# Patient Record
Sex: FEMALE | Race: BLACK OR AFRICAN AMERICAN | NOT HISPANIC OR LATINO | Employment: FULL TIME | URBAN - NONMETROPOLITAN AREA
[De-identification: names, ages, dates, MRNs, and addresses within clinical notes are randomized per-mention and may not be internally consistent; named-entity substitution may affect disease eponyms.]

---

## 2019-08-20 ENCOUNTER — TRANSCRIBE ORDERS (OUTPATIENT)
Dept: PHYSICAL THERAPY | Facility: HOSPITAL | Age: 44
End: 2019-08-20

## 2019-08-20 ENCOUNTER — HOSPITAL ENCOUNTER (OUTPATIENT)
Dept: PHYSICAL THERAPY | Facility: HOSPITAL | Age: 44
Setting detail: THERAPIES SERIES
Discharge: HOME | End: 2019-08-20

## 2019-08-20 DIAGNOSIS — M51.16 INTERVERTEBRAL DISC DISORDER WITH RADICULOPATHY OF LUMBAR REGION: Primary | ICD-10-CM

## 2019-08-20 DIAGNOSIS — M51.16 INTERVERTEBRAL DISC DISORDERS WITH RADICULOPATHY, LUMBAR REGION: Primary | ICD-10-CM

## 2019-08-20 PROCEDURE — 97110 THERAPEUTIC EXERCISES: CPT | Performed by: PHYSICAL THERAPIST

## 2019-08-20 PROCEDURE — 97162 PT EVAL MOD COMPLEX 30 MIN: CPT | Performed by: PHYSICAL THERAPIST

## 2019-08-20 NOTE — THERAPY EVALUATION
Outpatient Physical Therapy Ortho Initial Evaluation  [facility]  Megha Selby, PT, DPT, CSCS       Patient Name: Elle Aguilera  : 1975  MRN: [id number]  Today's Date: 2019      Visit Date: 2019    Pt reports 6/10 pain pre treatment, 2/10 pain post treatment  Reports N/A% of improvement.  Attended  visits.  Insurance available: medical necessity  Next MD appt: 2019.  Recertification: 09/10/2019.     Past Medical History:   Diagnosis Date   • Hypertension         Past Surgical History:   Procedure Laterality Date   • BACK SURGERY  2015    Spinal Fusion- L4/L5   • HYSTERECTOMY     • ULNAR NERVE TRANSPOSITION Right        Visit Dx:     ICD-10-CM ICD-9-CM   1. Intervertebral disc disorder with radiculopathy of lumbar region M51.16 724.4     Number of days off work: None    Patient is .    Patient has grown children.    Allergies       Bactrim [Sulfamethoxazole-trimethoprim]Mental Status Change   Reglan [Metoclopramide]Other (See Comments), GI Intolerance     Medications:Lasix, Amlopine, Khlor-con, Lisinopril      Patient History     Row Name 19 1500             History    Chief Complaint  Pain  -AJ      Type of Pain  Back pain  -AJ      Date Current Problem(s) Began  -- Chronic  -AJ      Brief Description of Current Complaint  patient reprots she had a spinal fusion almost 4 years ago and was doing well. She rprots then 2019 they came up on a montrell who was unrespnsive so her and 3 of her coworkers decided to lift him and he was almost 300#. She reports the next morning when she got out of bed she felt ronny nils her back. She reports pain down the entire L leg and in her LB again.  -AJ      Previous treatment for THIS PROBLEM  Medication;Surgery;Rehabilitation  -AJ      Surgery Date:  -- 2015  -AJ      Patient/Caregiver Goals  Relieve pain;Return to prior level of function  -AJ      Current Tobacco Use  ~1/2ppd  -AJ      Smoking  Status  daily smoker  -AJ      Occupation/sports/leisure activities  Occupation: ; Hobbies: anything outdoor, camping, fishing  -AJ      Patient seeing anyone else for problem(s)?  Yes, neurosurgeon  -AJ      What clinical tests have you had for this problem?  -- None, MRI was too expensive  -AJ      History of Previous Related Injuries  None prior to this.  -AJ      Are you or can you be pregnant  No  -AJ         Pain     Pain Location  Back  -AJ      Pain at Present  6  -AJ      Pain at Best  1  -AJ      Pain at Worst  10  -AJ      Pain Frequency  Constant/continuous  -AJ      Pain Description  Pressure;Pins and needles  -AJ      What Performance Factors Make the Current Problem(s) WORSE?  sitting, walking longer distances  -AJ      What Performance Factors Make the Current Problem(s) BETTER?  squatting  -AJ      Tolerance Time- Sitting  20-30 minutes  -AJ      Tolerance Time- Walking  15 minutes  -AJ      Is your sleep disturbed?  Yes  -AJ      Is medication used to assist with sleep?  Yes  -AJ      What position do you sleep in?  Left sidelying  -AJ      Difficulties at work?  sitting at a [employer]  -AJ      Difficulties with ADL's?  none  -AJ      Difficulties with recreational activities?  outdoor activites  -AJ        User Key  (r) = Recorded By, (t) = Taken By, (c) = Cosigned By    Initials Name Provider Type    AJ Megha Selby, PT DPT Physical Therapist          PT Ortho     Row Name 08/20/19 1500       Subjective Comments    Subjective Comments  Patient wishes to relieve the pain.  -AJ       Precautions and Contraindications    Precautions  Previous lumbar fusion  -AJ       Subjective Pain    Able to rate subjective pain?  yes  -AJ    Pre-Treatment Pain Level  6  -AJ    Post-Treatment Pain Level  2  -AJ       Posture/Observations    Posture- WNL  Posture is WNL For C-T-L spine  -AJ    Posture/Observations Comments  No distress, appears uncomfortable sitting, shifting often.  -AJ        Sensory Screen for Light Touch- Lower Quarter Clearing    L1 (inguinal area)  Bilateral:;Intact  -AJ    L2 (anterior mid thigh)  Bilateral:;Intact  -AJ    L3 (distal anterior thigh)  Bilateral:;Intact  -AJ    L4 (medial lower leg/foot)  Bilateral:;Intact  -AJ    L5 (lateral lower leg/great toe)  Bilateral:;Intact  -AJ    S1 (bottom of foot)  Bilateral:;Intact  -AJ       Lumbar/SI Special Tests    Standing Flexion Test (SI Dysfunction)  Bilateral:;Negative  -AJ    Stork Test (SI Dysfunction)  Bilateral:;Negative  -AJ    Trendelenburg Test (Gluteus Medius Weakness)  Bilateral:;Negative  -AJ    Slump Test (Neural Tension)  Bilateral:;Negative  -AJ    Bhavana Cj Test (HNP)  Negative  -AJ    SLR (Neural Tension)  Left:;Positive;Right:;Negative  -AJ    SI Compression Test (SI Dysfunction)  Bilateral:;Negative  -AJ    SI Distraction Test (SI Dysfunction)  Bilateral:;Negative  -AJ    BLAYNE (hip vs. SI Dysfunction)  Bilateral:;Negative  -AJ    FAIR Test (Piriformis Syndrome)  Left:;Positive;Right:;Negative  -AJ       Mariya's Signs    Superficial and non-anatomical tenderness  Negative  -AJ    Simulation test  Negative  -AJ    Distraction straight leg raise test (sitting vs supine)  Negative  -AJ    Regional disturbances  Negative  -AJ    Overreaction to examination  Negative  -AJ       Head/Neck/Trunk    Trunk Extension AROM  12°  -AJ    Trunk Flexion AROM  65°  -AJ    Trunk Lt Lateral Flexion AROM  WNL  -AJ    Trunk Rt Lateral Flexion AROM  WNL  -AJ    Trunk Lt Rotation AROM  75% of range  -AJ    Trunk Rt Rotation AROM  75% of range  -AJ       MMT (Manual Muscle Testing)    General MMT Comments  b LE 5/5, R HS testing caused increase in L LBP.  -AJ       Sensation    Sensation WNL?  WNL  -AJ    Light Touch  No apparent deficits  -AJ    Additional Comments  Reports on/off rad pain, L LE.  -AJ       Lower Extremity Flexibility    Hamstrings  Bilateral:;Mildly limited  -AJ    LE Other Flexibility  Left:;Moderately  limited;Right:;Mildly limited  -       Pathomechanics    Spine Pathomechanics  Excessive thoracic kyphosis with forward bend;Limited lumbar flattening with forward bend  -       Transfers    Comment (Transfers)  I with al ltransfers, poor log roll technique.  -       Gait/Stairs Assessment/Training    Comment (Gait/Stairs)  FWB, non-antalgic gait, no distress.  -      User Key  (r) = Recorded By, (t) = Taken By, (c) = Cosigned By    Initials Name Provider Type    Megha Bustamante, PT DPT Physical Therapist                      Therapy Education  Given: HEP, Symptoms/condition management, Pain management, Posture/body mechanics, Mobility training(POC)  Program: New  How Provided: Verbal, Demonstration, Written  Provided to: Patient  Level of Understanding: Verbalized, Demonstrated     PT OP Goals     Row Name 08/20/19 1500          PT Short Term Goals    STG Date to Achieve  09/10/19  -     STG 1  I with HEP and have additions/changes by next recertification.  -     STG 2  Patient able ot show proper log roll technique.  -     STG 3  AROM lumbar extension >= 20°.  -     STG 4  AROM B trunk ROT WNL.  -     STG 5  Patient to report rad pain rarely occurs now.  -        Long Term Goals    LTG Date to Achieve  09/27/19  -     LTG 1  AROm for lumbar psine all WNL, no increase in pain.  -     LTG 2  B LE 5/5, no change in pain.  -     LTG 3  Negative SLR B.  -     LTG 4  Negative piriformis B.  -     LTG 5  Patient to report no issues sitting at work.  -     LTG 6  I with final HEP.  -     LTG 7  D/C with a final HE pand free 30 day fitness formula memembership.  -        Time Calculation    PT Goal Re-Cert Due Date  09/10/19  -       User Key  (r) = Recorded By, (t) = Taken By, (c) = Cosigned By    Initials Name Provider Type    Megha Bustamante, PT DPT Physical Therapist         Barriers to Rehab: Include significant or possible arthritic/degenerative changes that  have occurred within the spine.    Safety Issues: None noted.      PT Assessment/Plan     Row Name 08/20/19 1500          PT Assessment    Functional Limitations  Limitation in home management;Limitations in community activities;Performance in leisure activities;Performance in self-care ADL;Performance in work activities  -     Impairments  Endurance;Impaired flexibility;Impaired muscle endurance;Impaired muscle length;Impaired muscle power;Range of motion;Posture;Pain;Muscle strength;Joint mobility;Joint integrity;Impaired postural alignment  -     Assessment Comments  Patient is exhibiting s/s of muscle strain with piriformis tight ness and sciatic nerve irritation. SHe would benefit from mild edie extension, stretches, and core stab. No issues with todays ther ex and less pain post treatment and no rad pain.  -AJ     Rehab Potential  Good  -AJ     Patient/caregiver participated in establishment of treatment plan and goals  Yes  -AJ     Patient would benefit from skilled therapy intervention  Yes  -AJ        PT Plan    PT Frequency  2x/week  -AJ     Predicted Duration of Therapy Intervention (Therapy Eval)  6-10 visits  -AJ     Planned CPT's?  PT EVAL MOD COMPLELITY: 28511;PT RE-EVAL: 77588;PT THER PROC EA 15 MIN: 01836;PT THER ACT EA 15 MIN: 28166;PT MANUAL THERAPY EA 15 MIN: 97408;PT ELECTRICAL STIM UNATTEND: ;PT THER SUPP EA 15 MIN  -AJ     Physical Therapy Interventions (Optional Details)  gait training;gross motor skills;home exercise program;lumbar stabilization;manual therapy techniques;modalities;patient/family education;postural re-education;ROM (Range of Motion);strengthening;stretching  -     PT Plan Comments  progress overall core stability, flexibility, posture, and centralization of rad s/s.  -AJ       User Key  (r) = Recorded By, (t) = Taken By, (c) = Cosigned By    Initials Name Provider Type    Megha Bustamante, PT DPT Physical Therapist       Other therapeutic activities  "and/or exercises will be prescribed depending on the patients progress or lack there of.    Modalities     Row Name 08/20/19 1500             Ice    Patient denies application of Ice  Yes  -AJ      Patient reports will apply ice at home to involved area  Yes  -AJ        User Key  (r) = Recorded By, (t) = Taken By, (c) = Cosigned By    Initials Name Provider Type    Megha Bustamante, PT DPT Physical Therapist        Exercises     Row Name 08/20/19 1500             Subjective Comments    Subjective Comments  Patient wishes to relieve the pain.  -AJ         Subjective Pain    Able to rate subjective pain?  yes  -AJ      Pre-Treatment Pain Level  6  -AJ      Post-Treatment Pain Level  2  -AJ         Exercise 1    Exercise Name 1  Pro II LE- endurance  -AJ      Time 1  10 minutes  -AJ      Additional Comments  L 4.0  -AJ         Exercise 2    Exercise Name 2  B St. HS S  -AJ      Reps 2  2  -AJ      Time 2  30 seconds  -AJ         Exercise 3    Exercise Name 3  B sitting piriformis S  -AJ      Reps 3  2  -AJ      Time 3  30 seconds  -AJ         Exercise 4    Exercise Name 4  LTR  -AJ      Reps 4  10  -AJ      Time 4  10\" hold  -AJ         Exercise 5    Exercise Name 5  DEMI  -AJ      Time 5  5 minutes  -AJ        User Key  (r) = Recorded By, (t) = Taken By, (c) = Cosigned By    Initials Name Provider Type    Megha Bustamante, PT DPT Physical Therapist                        Outcome Measure Options: Modifed Owestry  Modified Oswestry  Modified Oswestry Score/Comments: 24/50 = 48%      Time Calculation:     Start Time: 1500  Stop Time: 1546  Time Calculation (min): 46 min  Total Timed Code Minutes- PT: 23 minute(s)     Therapy Charges for Today     Code Description Service Date Service Provider Modifiers Qty    39227027307 HC PT EVAL MOD COMPLEXITY 2 8/20/2019 Megha Selby, PT DPT GP 1    33086609783 HC PT THER PROC EA 15 MIN 8/20/2019 Megha Selby, PT DPT GP 2    60789210036 HC PT THER " SUPP EA 15 MIN 8/20/2019 Megha Selby, PT DPT GP 1          PT G-Codes  Outcome Measure Options: Modifed Owestry  Modified Oswestry Score/Comments: 24/50 = 48%         Megha Selby, PT DPT, CSCS  8/20/2019

## 2019-08-21 ENCOUNTER — APPOINTMENT (OUTPATIENT)
Dept: PHYSICAL THERAPY | Facility: HOSPITAL | Age: 44
End: 2019-08-21

## 2019-08-22 ENCOUNTER — HOSPITAL ENCOUNTER (OUTPATIENT)
Dept: PHYSICAL THERAPY | Facility: HOSPITAL | Age: 44
Setting detail: THERAPIES SERIES
Discharge: HOME | End: 2019-08-22

## 2019-08-22 DIAGNOSIS — M51.16 INTERVERTEBRAL DISC DISORDER WITH RADICULOPATHY OF LUMBAR REGION: Primary | ICD-10-CM

## 2019-08-22 PROCEDURE — 97110 THERAPEUTIC EXERCISES: CPT

## 2019-08-22 NOTE — THERAPY TREATMENT NOTE
"    Outpatient Physical Therapy Ortho Treatment Note  [facility]  Mitzy Read PTA       Patient Name: Elle Aguilera  : 1975  MRN: [id number]  Today's Date: 2019      Visit Date: 2019     Visits: 2/2  Insurance Visits Approved: based on medical necessity  Recert Due: 09/10/2019  MD Appt: 2019  Pain: pretreatment 0/10; post treatment 0/10  Improvement: pt is subjectively reporting \"yes\"% improvement since initial evaluation    Visit Dx:    ICD-10-CM ICD-9-CM   1. Intervertebral disc disorder with radiculopathy of lumbar region M51.16 724.4       There is no problem list on file for this patient.       Past Medical History:   Diagnosis Date   • Hypertension         Past Surgical History:   Procedure Laterality Date   • BACK SURGERY  2015    Spinal Fusion- L4/L5   • HYSTERECTOMY     • ULNAR NERVE TRANSPOSITION Right        PT Ortho     Row Name 19 1600       Precautions and Contraindications    Precautions  Previous lumbar fusion  -       Subjective Pain    Able to rate subjective pain?  yes  -    Pre-Treatment Pain Level  0  -    Post-Treatment Pain Level  0  -    Row Name 19 1500       Subjective Comments    Subjective Comments  Patient wishes to relieve the pain.  -AJ       Precautions and Contraindications    Precautions  Previous lumbar fusion  -       Subjective Pain    Able to rate subjective pain?  yes  -    Pre-Treatment Pain Level  6  -    Post-Treatment Pain Level  2  -       Posture/Observations    Posture- WNL  Posture is WNL For C-T-L spine  -AJ    Posture/Observations Comments  No distress, appears uncomfortable sitting, shifting often.  -AJ       Sensory Screen for Light Touch- Lower Quarter Clearing    L1 (inguinal area)  Bilateral:;Intact  -AJ    L2 (anterior mid thigh)  Bilateral:;Intact  -AJ    L3 (distal anterior thigh)  Bilateral:;Intact  -AJ    L4 (medial lower leg/foot)  Bilateral:;Intact  -AJ    L5 (lateral " lower leg/great toe)  Bilateral:;Intact  -AJ    S1 (bottom of foot)  Bilateral:;Intact  -AJ       Lumbar/SI Special Tests    Standing Flexion Test (SI Dysfunction)  Bilateral:;Negative  -AJ    Stork Test (SI Dysfunction)  Bilateral:;Negative  -AJ    Trendelenburg Test (Gluteus Medius Weakness)  Bilateral:;Negative  -AJ    Slump Test (Neural Tension)  Bilateral:;Negative  -AJ    Bhavana Cj Test (HNP)  Negative  -AJ    SLR (Neural Tension)  Left:;Positive;Right:;Negative  -AJ    SI Compression Test (SI Dysfunction)  Bilateral:;Negative  -AJ    SI Distraction Test (SI Dysfunction)  Bilateral:;Negative  -AJ    BLAYNE (hip vs. SI Dysfunction)  Bilateral:;Negative  -AJ    FAIR Test (Piriformis Syndrome)  Left:;Positive;Right:;Negative  -AJ       Mariya's Signs    Superficial and non-anatomical tenderness  Negative  -AJ    Simulation test  Negative  -AJ    Distraction straight leg raise test (sitting vs supine)  Negative  -AJ    Regional disturbances  Negative  -AJ    Overreaction to examination  Negative  -AJ       Head/Neck/Trunk    Trunk Extension AROM  12°  -AJ    Trunk Flexion AROM  65°  -AJ    Trunk Lt Lateral Flexion AROM  WNL  -AJ    Trunk Rt Lateral Flexion AROM  WNL  -AJ    Trunk Lt Rotation AROM  75% of range  -AJ    Trunk Rt Rotation AROM  75% of range  -AJ       MMT (Manual Muscle Testing)    General MMT Comments  b LE 5/5, R HS testing caused increase in L LBP.  -AJ       Sensation    Sensation WNL?  WNL  -AJ    Light Touch  No apparent deficits  -AJ    Additional Comments  Reports on/off rad pain, L LE.  -AJ       Lower Extremity Flexibility    Hamstrings  Bilateral:;Mildly limited  -AJ    LE Other Flexibility  Left:;Moderately limited;Right:;Mildly limited  -AJ       Pathomechanics    Spine Pathomechanics  Excessive thoracic kyphosis with forward bend;Limited lumbar flattening with forward bend  -AJ       Transfers    Comment (Transfers)  I with al ltransfers, poor log roll technique.  -AJ       Gait/Stairs  Assessment/Training    Comment (Gait/Stairs)  FWB, non-antalgic gait, no distress.  -      User Key  (r) = Recorded By, (t) = Taken By, (c) = Cosigned By    Initials Name Provider Type     Mitzy Read, PTA Physical Therapy Assistant    AJ Megha Selby, PT DPT Physical Therapist                      PT Assessment/Plan     Row Name 08/22/19 1600          PT Assessment    Assessment Comments  patient reporting relief iwth doing her HEP at home and appears compliant at this time. able to add to HEP today with patient showing good understanding of additions added.   -        PT Plan    PT Frequency  2x/week  -     PT Plan Comments  progress to prone alt hip extension next visit  -       User Key  (r) = Recorded By, (t) = Taken By, (c) = Cosigned By    Initials Name Provider Type     Mitzy Read, PTA Physical Therapy Assistant            Exercises     Row Name 08/22/19 1600             Subjective Comments    Subjective Comments  states that she has been doing well since her initial evaluation. was given some stretches that have really helped her a lot  -         Subjective Pain    Able to rate subjective pain?  yes  -      Pre-Treatment Pain Level  0  -      Post-Treatment Pain Level  0  -         Exercise 1    Exercise Name 1  Pro II LE  -MH      Time 1  6 minutes  -      Additional Comments  L 5.0 endurance and strength  -         Exercise 2    Exercise Name 2  B St. HS S  -MH      Reps 2  2  -MH      Time 2  30 sec hold  -MH         Exercise 3    Exercise Name 3  B Sitting Piriformis S  -MH      Reps 3  2  -MH      Time 3  30 sec hold  -MH         Exercise 4    Exercise Name 4  Sit to/from stand with Lx Ext  -MH      Reps 4  10  -MH      Time 4  5 sec hold  -MH         Exercise 5    Exercise Name 5  LTR  -MH      Reps 5  10  -MH      Time 5  10 sec hold  -MH         Exercise 6    Exercise Name 6  Bridges  -MH      Reps 6  20  -MH      Time 6  5 sec hold  -MH         Exercise 7     Exercise Name 7  BKLL  -      Reps 7  20  -      Time 7  5 sec hold  -         Exercise 8    Exercise Name 8  DEMI  -      Time 8  5 minutes  -        User Key  (r) = Recorded By, (t) = Taken By, (c) = Cosigned By    Initials Name Provider Type     Mitzy Read PTA Physical Therapy Assistant                       PT OP Goals     Row Name 08/22/19 1600          PT Short Term Goals    STG Date to Achieve  09/10/19  -     STG 1  I with HEP and have additions/changes by next recertification.  -     STG 1 Progress  Met  -     STG 2  Patient able ot show proper log roll technique.  -     STG 2 Progress  Ongoing  -     STG 3  AROM lumbar extension >= 20°.  -     STG 3 Progress  Ongoing  -     STG 4  AROM B trunk ROT WNL.  -     STG 4 Progress  Ongoing  -     STG 5  Patient to report rad pain rarely occurs now.  -     STG 5 Progress  Ongoing  [facility]        Long Term Goals    LTG Date to Achieve  09/27/19  -     LTG 1  AROm for lumbar psine all WNL, no increase in pain.  -     LTG 1 Progress  Ongoing  -     LTG 2  B LE 5/5, no change in pain.  -     LTG 2 Progress  Ongoing  -     LTG 3  Negative SLR B.  -     LTG 3 Progress  Ongoing  -     LTG 4  Negative piriformis B.  -     LTG 4 Progress  Ongoing  -     LTG 5  Patient to report no issues sitting at work.  -     LTG 5 Progress  Ongoing  [facility]     LTG 6  I with final HEP.  -     LTG 6 Progress  Ongoing  [facility]     LTG 7  D/C with a final HE pand free 30 day fitness formula mememberip.  -     LTG 7 Progress  Ongoing  [facility]        Time Calculation    PT Goal Re-Cert Due Date  09/10/19  -       User Key  (r) = Recorded By, (t) = Taken By, (c) = Cosigned By    Initials Name Provider Type    Mitzy Ceja PTA Physical Therapy Assistant          Therapy Education  Education Details: Rayshawn AVALOS  Given: HEP, Symptoms/condition management, Pain management, Posture/body mechanics, Mobility training  Program: Reinforced,  New  How Provided: Demonstration, Verbal, Written  Provided to: Patient  Level of Understanding: Teach back education performed, Verbalized, Demonstrated              Time Calculation:   Start Time: 1610  Stop Time: 1659  Time Calculation (min): 49 min  Total Timed Code Minutes- PT: 49 minute(s)  Therapy Charges for Today     Code Description Service Date Service Provider Modifiers Qty    40728277110 HC PT THER PROC EA 15 MIN 8/22/2019 Mitzy Read PTA GP 3    24330931940 HC PT THER SUPP EA 15 MIN 8/22/2019 Mitzy Read PTA GP 1                    Mitzy Read PTA  8/22/2019

## 2019-08-27 ENCOUNTER — HOSPITAL ENCOUNTER (OUTPATIENT)
Dept: PHYSICAL THERAPY | Facility: HOSPITAL | Age: 44
Setting detail: THERAPIES SERIES
Discharge: HOME | End: 2019-08-27

## 2019-08-27 DIAGNOSIS — M51.16 INTERVERTEBRAL DISC DISORDER WITH RADICULOPATHY OF LUMBAR REGION: Primary | ICD-10-CM

## 2019-08-27 PROCEDURE — 97110 THERAPEUTIC EXERCISES: CPT

## 2019-08-27 NOTE — THERAPY TREATMENT NOTE
"    Outpatient Physical Therapy Ortho Treatment Note  [facility]  Mitzy Read PTA       Patient Name: Elle Aguilera  : 1975  MRN: [id number]  Today's Date: 2019      Visit Date: 2019     Visits: 3/3  Insurance Visits Approved: based on medical necessity  Recert Due: 09/10/2019  MD Appt: 2019  Pain: pretreatment 2/10; post treatment 0/10  Improvement: pt is subjectively reporting \"yes\"% improvement since initial evaluation    Visit Dx:    ICD-10-CM ICD-9-CM   1. Intervertebral disc disorder with radiculopathy of lumbar region M51.16 724.4       There is no problem list on file for this patient.       Past Medical History:   Diagnosis Date   • Hypertension         Past Surgical History:   Procedure Laterality Date   • BACK SURGERY  2015    Spinal Fusion- L4/L5   • HYSTERECTOMY     • ULNAR NERVE TRANSPOSITION Right        PT Ortho     Row Name 19 1600       Subjective Comments    Subjective Comments  have been on her feet all day. has kids in school today. has been hectic.   -       Precautions and Contraindications    Precautions  Previous lumbar fusion  -       Subjective Pain    Able to rate subjective pain?  yes  -    Pre-Treatment Pain Level  2  -    Post-Treatment Pain Level  0  -      User Key  (r) = Recorded By, (t) = Taken By, (c) = Cosigned By    Initials Name Provider Type     Mitzy Read PTA Physical Therapy Assistant                      PT Assessment/Plan     Row Name 19 1600          PT Assessment    Assessment Comments  patient able to complete therex with no difficulty today. good effort throughout  -        PT Plan    PT Frequency  2x/week  -     PT Plan Comments  prone alternating hip extension next visit  -       User Key  (r) = Recorded By, (t) = Taken By, (c) = Cosigned By    Initials Name Provider Type     Mitzy Read PTA Physical Therapy Assistant            Exercises     Row Name 19 1600 "             Subjective Comments    Subjective Comments  have been on her feet all day. has kids in school today. has been hectic.   -         Subjective Pain    Able to rate subjective pain?  yes  -      Pre-Treatment Pain Level  2  -      Post-Treatment Pain Level  0  -         Exercise 1    Exercise Name 1  Pro II LE's  -      Time 1  10 minutes  -      Additional Comments  L 5.0 endurance, LE strengthening  -         Exercise 2    Exercise Name 2  B St. HS S  -MH      Reps 2  2  -MH      Time 2  30 sec hold  -MH         Exercise 3    Exercise Name 3  B Sitting Piriformis S  -MH      Reps 3  2  -MH      Time 3  30 sec hold  -MH         Exercise 4    Exercise Name 4  LTR  -MH      Reps 4  10  -MH      Time 4  10 sec hold  -         Exercise 5    Exercise Name 5  Bridges  -MH      Reps 5  20  -MH      Time 5  5 sec hold  -MH         Exercise 6    Exercise Name 6  Sit to/from stand with Lx Ext  -      Reps 6  20  -MH      Time 6  5 sec hold  -MH         Exercise 7    Exercise Name 7  BKLL with tband resist  -      Time 7  5 sec hold repeating for 4 minutes  -      Additional Comments  Green   -         Exercise 8    Exercise Name 8  Hooklying Hip ABD with tband resist  -      Reps 8  20  -MH      Time 8  5 sec hold  -MH         Exercise 9    Exercise Name 9  DEMI  -      Time 9  5 minutes  -        User Key  (r) = Recorded By, (t) = Taken By, (c) = Cosigned By    Initials Name Provider Type    Mitzy Ceja, PTA Physical Therapy Assistant                       PT OP Goals     Row Name 08/27/19 1600          PT Short Term Goals    STG Date to Achieve  09/10/19  -     STG 1  I with HEP and have additions/changes by next recertification.  -     STG 1 Progress  Met  -     STG 2  Patient able ot show proper log roll technique.  -     STG 2 Progress  Ongoing  -     STG 3  AROM lumbar extension >= 20°.  -     STG 3 Progress  Ongoing  -     STG 4  AROM B trunk ROT WNL.  -      STG 4 Progress  Ongoing  -     STG 5  Patient to report rad pain rarely occurs now.  -     STG 5 Progress  Ongoing  -        Long Term Goals    LTG Date to Achieve  09/27/19  -     LTG 1  AROm for lumbar psine all WNL, no increase in pain.  -     LTG 1 Progress  Ongoing  -     LTG 2  B LE 5/5, no change in pain.  -     LTG 2 Progress  Ongoing  -     LTG 3  Negative SLR B.  -     LTG 3 Progress  Ongoing  -     LTG 4  Negative piriformis B.  -     LTG 4 Progress  Ongoing  -     LTG 5  Patient to report no issues sitting at work.  -     LTG 5 Progress  Ongoing  -     LTG 6  I with final HEP.  -     LTG 6 Progress  Ongoing  [facility]     LTG 7  D/C with a final HE pand free 30 day fitness formula memembership.  -     LTG 7 Progress  Ongoing  -        Time Calculation    PT Goal Re-Cert Due Date  09/10/19  -       User Key  (r) = Recorded By, (t) = Taken By, (c) = Cosigned By    Initials Name Provider Type     Mitzy Read PTA Physical Therapy Assistant          Therapy Education  Education Details: tband to BKLL  Given: HEP, Symptoms/condition management, Pain management, Posture/body mechanics  Program: Reinforced, Progressed  How Provided: Verbal, Demonstration  Provided to: Patient  Level of Understanding: Verbalized, Demonstrated              Time Calculation:   Start Time: 1605  Stop Time: 1654  Time Calculation (min): 49 min  Total Timed Code Minutes- PT: 49 minute(s)  Therapy Charges for Today     Code Description Service Date Service Provider Modifiers Qty    10704142921 HC PT THER PROC EA 15 MIN 8/27/2019 Mitzy Read PTA GP 3    59724888071 HC PT THER SUPP EA 15 MIN 8/27/2019 Mitzy Read PTA GP 1                    Mitzy Read PTA  8/27/2019

## 2019-08-29 ENCOUNTER — TELEPHONE (OUTPATIENT)
Dept: PHYSICAL THERAPY | Facility: HOSPITAL | Age: 44
End: 2019-08-29

## 2019-08-29 NOTE — TELEPHONE ENCOUNTER
Called patient secondary to no show for appointment. Patient states that she didn't think she had another appointment until next week because of the appointment card she got at her last visit. Notified patient that those appointments were for next week, that we always schedule a week ahead. Patient is offered an appointment tomorrow at 0930 to recapture today's visit and she is agreeable. Business office is notified and patient is scheduled

## 2019-08-30 ENCOUNTER — HOSPITAL ENCOUNTER (OUTPATIENT)
Dept: PHYSICAL THERAPY | Facility: HOSPITAL | Age: 44
Setting detail: THERAPIES SERIES
Discharge: HOME | End: 2019-08-30

## 2019-08-30 DIAGNOSIS — M51.16 INTERVERTEBRAL DISC DISORDER WITH RADICULOPATHY OF LUMBAR REGION: Primary | ICD-10-CM

## 2019-08-30 PROCEDURE — 97110 THERAPEUTIC EXERCISES: CPT

## 2019-08-30 PROCEDURE — G0283 ELEC STIM OTHER THAN WOUND: HCPCS

## 2019-08-30 NOTE — THERAPY TREATMENT NOTE
Outpatient Physical Therapy Ortho Treatment Note  [facility]     Patient Name: Elle Aguilera  : 1975  MRN: [id number]  Today's Date: 2019      Visit Date: 2019  Pt reports 2/10 pain pre treatment, 0/10 pain post treatment  Reports 50% of improvement.  Attended 4/4 visits.  Insurance available:based on medical need   Next MD appt:2019.  Recertification: 09/10/2019.  Visit Dx:    ICD-10-CM ICD-9-CM   1. Intervertebral disc disorder with radiculopathy of lumbar region M51.16 724.4       There is no problem list on file for this patient.       Past Medical History:   Diagnosis Date   • Hypertension         Past Surgical History:   Procedure Laterality Date   • BACK SURGERY  2015    Spinal Fusion- L4/L5   • HYSTERECTOMY     • ULNAR NERVE TRANSPOSITION Right        PT Ortho     Row Name 19 1000       Subjective Comments    Subjective Comments  pt reports that she is 50 % improved.  -TL       Precautions and Contraindications    Precautions  Previous lumbar fusion  -       Subjective Pain    Able to rate subjective pain?  yes  -    Pre-Treatment Pain Level  2  -    Row Name 19 1600       Subjective Comments    Subjective Comments  have been on her feet all day. has kids in school today. has been hectic.   -       Precautions and Contraindications    Precautions  Previous lumbar fusion  -       Subjective Pain    Able to rate subjective pain?  yes  -    Pre-Treatment Pain Level  2  -    Post-Treatment Pain Level  0  -      User Key  (r) = Recorded By, (t) = Taken By, (c) = Cosigned By    Initials Name Provider Type     Mitzy Read PTA Physical Therapy Assistant     Paula Cortés PTA Physical Therapy Assistant                      PT Assessment/Plan     Row Name 19 1000          PT Assessment    Assessment Comments  Pt has met 1,2, and 5 short term goals this date. pt feels that pain stationary at the low back now vs  down left leg. Pt reports that she has been doing HEP . pt reports that she is 50% improved. pt has decreased. Pt requireds cues not to cross legs and set with equal weight bearing. PTA instructed pt to not to sit more than an hour before getting up to walk. pt tolerated new ex prone TKE with glutes, add IR with prone heels squeezes with glutes, add ball to bridges and LTR.  -TL        PT Plan    PT Frequency  2x/week  -TL     PT Plan Comments  add prone SLR next visit.  -TL       User Key  (r) = Recorded By, (t) = Taken By, (c) = Cosigned By    Initials Name Provider Type    Paula Calderon PTA Physical Therapy Assistant          Modalities     Row Name 08/30/19 0900             Moist Heat    MH Applied  Yes  -TL      Location  low back  -TL      Rx Minutes  15 mins  -TL      MH S/P Rx  Yes  -TL         ELECTRICAL STIMULATION    Attended/Unattended  Unattended  -TL      Stimulation Type  IFC  -TL      Location/Electrode Placement/Other  paraspinals lumbar  -TL       PT E-Stim Unattended (Manual) Minutes  15  -TL        User Key  (r) = Recorded By, (t) = Taken By, (c) = Cosigned By    Initials Name Provider Type    TL Paula Cortés PTA Physical Therapy Assistant        Exercises     Row Name 08/30/19 1000             Subjective Comments    Subjective Comments  pt reports that she is 50 % improved.  -TL         Subjective Pain    Able to rate subjective pain?  yes  -TL      Pre-Treatment Pain Level  2  -TL      Post-Treatment Pain Level  0  -TL         Exercise 1    Exercise Name 1  st Ham S gonzales  -TL      Reps 1  2  -TL      Time 1  30 sec hold  -TL         Exercise 2    Exercise Name 2  Supine pirif S  -TL      Reps 2  2  -TL      Time 2  30 sec hold  -TL         Exercise 3    Exercise Name 3  Supine ham S  -TL      Reps 3  2  -TL      Time 3  30 sec hold  -TL         Exercise 4    Exercise Name 4  LTR with ball squeezes to keep pelvic in neutral   -TL      Reps 4  10  -TL      Time 4  10 sec hold  -TL          Exercise 5    Exercise Name 5  Bridges with ball squeezes  -TL      Reps 5  20  -TL      Time 5  5 sec hold  -TL        User Key  (r) = Recorded By, (t) = Taken By, (c) = Cosigned By    Initials Name Provider Type    Paula Calderon PTA Physical Therapy Assistant                       PT OP Goals     Row Name 08/30/19 1000          PT Short Term Goals    STG Date to Achieve  09/10/19  -TL     STG 1  I with HEP and have additions/changes by next recertification.  -TL     STG 1 Progress  Met  -TL     STG 2  Patient able ot show proper log roll technique.  -TL     STG 2 Progress  Met  -TL     STG 3  AROM lumbar extension >= 20°.  -TL     STG 3 Progress  Ongoing  -TL     STG 4  AROM B trunk ROT WNL.  -TL     STG 4 Progress  Ongoing  -TL     STG 5  Patient to report rad pain rarely occurs now.  -TL     STG 5 Progress  Met  -TL        Long Term Goals    LTG Date to Achieve  09/27/19  -TL     LTG 1  AROm for lumbar psine all WNL, no increase in pain.  -TL     LTG 1 Progress  Ongoing  -TL     LTG 2  B LE 5/5, no change in pain.  -TL     LTG 2 Progress  Ongoing  -TL     LTG 3  Negative SLR B.  -TL     LTG 3 Progress  Ongoing  -TL     LTG 4  Negative piriformis B.  -TL     LTG 4 Progress  Ongoing  -TL     LTG 5  Patient to report no issues sitting at work.  -TL     LTG 5 Progress  Ongoing  -TL     LTG 6  I with final HEP.  -TL     LTG 6 Progress  Ongoing  -TL     LTG 7  D/C with a final HE pand free 30 day fitness formula memembership.  -TL     LTG 7 Progress  Ongoing  -TL        Time Calculation    PT Goal Re-Cert Due Date  09/10/19  -TL       User Key  (r) = Recorded By, (t) = Taken By, (c) = Cosigned By    Initials Name Provider Type    Paula Calderon PTA Physical Therapy Assistant          Therapy Education  Education Details: prone TKE with glutes, bridges with hip add using ball, Supine pirif S, education on not crossing legs, equal weight shifting, not sit longer than 1 hour before stretching or  walking.  Given: HEP, Symptoms/condition management, Pain management, Posture/body mechanics  Program: New, Reinforced  How Provided: Verbal, Demonstration, Written  Provided to: Patient  Level of Understanding: Teach back education performed, Verbalized, Demonstrated              Time Calculation:   Start Time: 0931  Stop Time: 1035  Time Calculation (min): 64 min  Total Timed Code Minutes- PT: 49 minute(s)  Therapy Charges for Today     Code Description Service Date Service Provider Modifiers Qty    07311894614 HC PT ELECTRICAL STIM UNATTENDED 8/30/2019 Paula Cortés, PTA  1    58896307269 HC PT THER PROC EA 15 MIN 8/30/2019 Paula Cortés, PTA GP 3                    Paula Cortés PTA  8/30/2019

## 2019-11-06 ENCOUNTER — DOCUMENTATION (OUTPATIENT)
Dept: PHYSICAL THERAPY | Facility: CLINIC | Age: 44
End: 2019-11-06

## 2019-11-06 DIAGNOSIS — M51.16 INTERVERTEBRAL DISC DISORDER WITH RADICULOPATHY OF LUMBAR REGION: Primary | ICD-10-CM

## 2019-11-06 NOTE — PROGRESS NOTES
Discharge Summary  Discharge Summary from Physical Therapy Report      Dates  PT visit: 08/20/2019  Number of Visits: 4     Discharge Status of Patient: Phone note from business office: Patient had a follow up in Sept with her physician where he gave her a new referral for another 3 weeks of therapy. She then went out of the country for a few weeks. She came across the form today and called to see if she would need to come back. I asked her how she was feeling and she said better. Explained that we wouldn't need to see her and if she did start having issues to get a new referral from the physician and we would be glad to get her started again.     Goals: Partially Met    Discharge Plan: Continue with current home exercise program as instructed        Date of Discharge 11/06/2019        Megha Selby, PT DPT, CSCS  Physical Therapist

## 2021-08-30 ENCOUNTER — OFFICE VISIT (OUTPATIENT)
Dept: PODIATRY | Facility: CLINIC | Age: 46
End: 2021-08-30

## 2021-08-30 VITALS
HEART RATE: 115 BPM | HEIGHT: 65 IN | OXYGEN SATURATION: 96 % | SYSTOLIC BLOOD PRESSURE: 143 MMHG | WEIGHT: 213.8 LBS | DIASTOLIC BLOOD PRESSURE: 78 MMHG | BODY MASS INDEX: 35.62 KG/M2

## 2021-08-30 DIAGNOSIS — R21 RASH AND OTHER NONSPECIFIC SKIN ERUPTION: ICD-10-CM

## 2021-08-30 DIAGNOSIS — B35.3 TINEA PEDIS OF BOTH FEET: Primary | ICD-10-CM

## 2021-08-30 PROCEDURE — 99204 OFFICE O/P NEW MOD 45 MIN: CPT | Performed by: PODIATRIST

## 2021-08-30 PROCEDURE — 11102 TANGNTL BX SKIN SINGLE LES: CPT | Performed by: PODIATRIST

## 2021-08-30 RX ORDER — AMLODIPINE BESYLATE 10 MG/1
10 TABLET ORAL DAILY
COMMUNITY
Start: 2021-08-12

## 2021-08-30 RX ORDER — LISINOPRIL 20 MG/1
20 TABLET ORAL DAILY
COMMUNITY
Start: 2021-08-12

## 2021-08-30 RX ORDER — METHYLPREDNISOLONE 4 MG/1
TABLET ORAL
Qty: 21 TABLET | Refills: 0 | Status: SHIPPED | OUTPATIENT
Start: 2021-08-30

## 2021-08-30 RX ORDER — FUROSEMIDE 20 MG/1
20 TABLET ORAL 2 TIMES DAILY
COMMUNITY

## 2021-08-30 RX ORDER — POTASSIUM CHLORIDE 1.5 G/1.77G
20 POWDER, FOR SOLUTION ORAL 2 TIMES DAILY
COMMUNITY

## 2021-08-30 RX ORDER — TERBINAFINE HYDROCHLORIDE 250 MG/1
250 TABLET ORAL DAILY
Qty: 21 TABLET | Refills: 0 | Status: SHIPPED | OUTPATIENT
Start: 2021-08-30 | End: 2021-09-14 | Stop reason: SDUPTHER

## 2021-09-14 ENCOUNTER — OFFICE VISIT (OUTPATIENT)
Dept: PODIATRY | Facility: CLINIC | Age: 46
End: 2021-09-14

## 2021-09-14 VITALS — OXYGEN SATURATION: 99 % | HEIGHT: 65 IN | BODY MASS INDEX: 35.49 KG/M2 | HEART RATE: 98 BPM | WEIGHT: 213 LBS

## 2021-09-14 DIAGNOSIS — B35.3 TINEA PEDIS OF BOTH FEET: Primary | ICD-10-CM

## 2021-09-14 PROCEDURE — 99214 OFFICE O/P EST MOD 30 MIN: CPT | Performed by: PODIATRIST

## 2021-09-14 RX ORDER — CLOTRIMAZOLE AND BETAMETHASONE DIPROPIONATE 10; .64 MG/G; MG/G
1 CREAM TOPICAL 2 TIMES DAILY
Qty: 45 G | Refills: 1 | Status: SHIPPED | OUTPATIENT
Start: 2021-09-14

## 2021-09-14 RX ORDER — TERBINAFINE HYDROCHLORIDE 250 MG/1
250 TABLET ORAL DAILY
Qty: 21 TABLET | Refills: 0 | Status: SHIPPED | OUTPATIENT
Start: 2021-09-14

## 2021-09-14 NOTE — PROGRESS NOTES
Elle Aguilera  1975  46 y.o. female    09/14/2021     Chief Complaint   Patient presents with   • Left Foot - Follow-up   • Right Foot - Follow-up       History of Present Illness    Elle Aguilera is a 46 y.o.female who presents to clinic today for follow-up.  States that the bottom of her feet are improving.    Past Medical History:   Diagnosis Date   • Hypertension          Past Surgical History:   Procedure Laterality Date   • BACK SURGERY  12/2015    Spinal Fusion- L4/L5   • HYSTERECTOMY     • ULNAR NERVE TRANSPOSITION Right          Family History   Problem Relation Age of Onset   • Diabetes Father    • Hypertension Father        Allergies   Allergen Reactions   • Bactrim [Sulfamethoxazole-Trimethoprim] Mental Status Change   • Reglan [Metoclopramide] Other (See Comments) and GI Intolerance     sweats       Social History     Socioeconomic History   • Marital status:      Spouse name: Not on file   • Number of children: Not on file   • Years of education: Not on file   • Highest education level: Not on file   Tobacco Use   • Smoking status: Current Every Day Smoker     Packs/day: 0.50     Types: Cigarettes   • Smokeless tobacco: Never Used   Substance and Sexual Activity   • Alcohol use: No   • Drug use: Defer   • Sexual activity: Defer         Current Outpatient Medications   Medication Sig Dispense Refill   • amLODIPine (NORVASC) 10 MG tablet Take 10 mg by mouth Daily.     • furosemide (LASIX) 20 MG tablet Take 20 mg by mouth 2 (Two) Times a Day.     • lisinopril (PRINIVIL,ZESTRIL) 20 MG tablet Take 20 mg by mouth Daily.     • methylPREDNISolone (MEDROL) 4 MG dose pack Take as directed 21 tablet 0   • potassium chloride (KLOR-CON) 20 MEQ packet Take 20 mEq by mouth 2 (Two) Times a Day.     • terbinafine (LamISIL) 250 MG tablet Take 1 tablet by mouth Daily. 21 tablet 0   • clotrimazole-betamethasone (LOTRISONE) 1-0.05 % cream Apply 1 application topically to the appropriate  "area as directed 2 (Two) Times a Day. 45 g 1     No current facility-administered medications for this visit.       Review of Systems   Constitutional: Positive for activity change.   HENT: Negative.    Eyes: Negative.    Respiratory: Negative.    Cardiovascular: Negative.    Gastrointestinal: Negative.    Endocrine: Negative.    Genitourinary: Negative.    Musculoskeletal: Negative.    Skin: Positive for color change, rash and wound.   Allergic/Immunologic: Negative.    Neurological: Negative.    Hematological: Negative.    Psychiatric/Behavioral: Negative.          OBJECTIVE    Pulse 98   Ht 165.1 cm (65\")   Wt 96.6 kg (213 lb)   SpO2 99%   BMI 35.45 kg/m²     Physical Exam  Vitals reviewed.   Constitutional:       General: She is not in acute distress.     Appearance: She is well-developed.   HENT:      Head: Normocephalic and atraumatic.      Nose: Nose normal.   Eyes:      Conjunctiva/sclera: Conjunctivae normal.      Pupils: Pupils are equal, round, and reactive to light.   Pulmonary:      Effort: Pulmonary effort is normal. No respiratory distress.      Breath sounds: No wheezing.   Musculoskeletal:         General: No deformity. Normal range of motion.   Skin:     General: Skin is warm and dry.      Capillary Refill: Capillary refill takes less than 2 seconds.   Neurological:      Mental Status: She is alert and oriented to person, place, and time.   Psychiatric:         Behavior: Behavior normal.         Thought Content: Thought content normal.            Foot/Ankle Exam:     Lower Extremity Exam:     Cardiovascular:    Pedal pulses palpable bilateral  No erythema or edema bilateral  Musculoskeletal:  Muscle strength WNL bilateral   No pain on palpation bilateral l  Dermatological:   Multiple hyperkeratotic lesions noted to plantar skin bilateral.  No erythema.  Neurological:   Sensation intact light touch bilateral.    Procedures        ASSESSMENT AND PLAN    Diagnoses and all orders for this " visit:    1. Tinea pedis of both feet (Primary)    Other orders  -     clotrimazole-betamethasone (LOTRISONE) 1-0.05 % cream; Apply 1 application topically to the appropriate area as directed 2 (Two) Times a Day.  Dispense: 45 g; Refill: 1  -     terbinafine (LamISIL) 250 MG tablet; Take 1 tablet by mouth Daily.  Dispense: 21 tablet; Refill: 0        -Pedal skin improving.  Biopsy results indicate tinea pedis.  Refilled Lamisil.  Rx for Lotrisone.  Recommended Lotrisone combined with urea 40% twice daily.  - All questions were answered to the patients satisfaction.  - RTC 4 weeks            This document has been electronically signed by Evans Aguila DPM on September 14, 2021 09:39 CDT     9/14/2021  09:39 CDT

## 2021-10-12 ENCOUNTER — OFFICE VISIT (OUTPATIENT)
Dept: PODIATRY | Facility: CLINIC | Age: 46
End: 2021-10-12

## 2021-10-12 VITALS — HEIGHT: 65 IN | WEIGHT: 213 LBS | BODY MASS INDEX: 35.49 KG/M2 | OXYGEN SATURATION: 98 % | HEART RATE: 94 BPM

## 2021-10-12 DIAGNOSIS — B35.3 TINEA PEDIS OF BOTH FEET: Primary | ICD-10-CM

## 2021-10-12 PROCEDURE — 99212 OFFICE O/P EST SF 10 MIN: CPT | Performed by: PODIATRIST

## 2021-10-12 NOTE — PROGRESS NOTES
Elle Aguilera  1975  46 y.o. female    Bilateral foot blister follow up     10/12/2021     Chief Complaint   Patient presents with   • Right Foot - Follow-up   • Left Foot - Follow-up       History of Present Illness    Elle Aguilera is a 46 y.o.female who presents to clinic today for follow-up.      Past Medical History:   Diagnosis Date   • Hypertension          Past Surgical History:   Procedure Laterality Date   • BACK SURGERY  12/2015    Spinal Fusion- L4/L5   • HYSTERECTOMY     • ULNAR NERVE TRANSPOSITION Right          Family History   Problem Relation Age of Onset   • Diabetes Father    • Hypertension Father        Allergies   Allergen Reactions   • Bactrim [Sulfamethoxazole-Trimethoprim] Mental Status Change   • Reglan [Metoclopramide] Other (See Comments) and GI Intolerance     sweats       Social History     Socioeconomic History   • Marital status:    Tobacco Use   • Smoking status: Current Every Day Smoker     Packs/day: 0.50     Types: Cigarettes   • Smokeless tobacco: Never Used   Substance and Sexual Activity   • Alcohol use: No   • Drug use: Defer   • Sexual activity: Defer         Current Outpatient Medications   Medication Sig Dispense Refill   • amLODIPine (NORVASC) 10 MG tablet Take 10 mg by mouth Daily.     • clotrimazole-betamethasone (LOTRISONE) 1-0.05 % cream Apply 1 application topically to the appropriate area as directed 2 (Two) Times a Day. 45 g 1   • furosemide (LASIX) 20 MG tablet Take 20 mg by mouth 2 (Two) Times a Day.     • lisinopril (PRINIVIL,ZESTRIL) 20 MG tablet Take 20 mg by mouth Daily.     • methylPREDNISolone (MEDROL) 4 MG dose pack Take as directed 21 tablet 0   • potassium chloride (KLOR-CON) 20 MEQ packet Take 20 mEq by mouth 2 (Two) Times a Day.     • terbinafine (LamISIL) 250 MG tablet Take 1 tablet by mouth Daily. 21 tablet 0     No current facility-administered medications for this visit.       Review of Systems   HENT: Negative.   "  Eyes: Negative.    Respiratory: Negative.    Cardiovascular: Negative.    Gastrointestinal: Negative.    Musculoskeletal: Negative.    Neurological: Negative.    Psychiatric/Behavioral: Negative.          OBJECTIVE    Pulse 94   Ht 165.1 cm (65\")   Wt 96.6 kg (213 lb)   SpO2 98%   BMI 35.45 kg/m²     Physical Exam  Vitals reviewed.   Constitutional:       General: She is not in acute distress.     Appearance: She is well-developed.   HENT:      Head: Normocephalic and atraumatic.   Pulmonary:      Effort: Pulmonary effort is normal. No respiratory distress.      Breath sounds: No wheezing.   Musculoskeletal:         General: No deformity. Normal range of motion.   Skin:     General: Skin is warm and dry.      Capillary Refill: Capillary refill takes less than 2 seconds.   Neurological:      Mental Status: She is alert and oriented to person, place, and time.   Psychiatric:         Behavior: Behavior normal.         Thought Content: Thought content normal.            Foot/Ankle Exam  Lower Extremity Exam:     Cardiovascular:    Pedal pulses palpable bilateral  No erythema or edema bilateral  Musculoskeletal:  Muscle strength WNL bilateral   No pain on palpation bilateral l  Dermatological:   Improving hyperkeratotic tissue to plantar skin bilateral.  Neurological:   Sensation intact light touch bilateral.    Procedures        ASSESSMENT AND PLAN    Diagnoses and all orders for this visit:    1. Tinea pedis of both feet (Primary)        -Skin has significantly improved.  Discontinue Lotrisone.  Continue urea 40%.  -All questions were answered to the patients satisfaction.  -RTC as needed            This document has been electronically signed by Evans Aguila DPM on October 12, 2021 12:27 CDT     10/12/2021  12:27 CDT    "